# Patient Record
Sex: FEMALE | Race: WHITE
[De-identification: names, ages, dates, MRNs, and addresses within clinical notes are randomized per-mention and may not be internally consistent; named-entity substitution may affect disease eponyms.]

---

## 2017-10-17 ENCOUNTER — HOSPITAL ENCOUNTER (OUTPATIENT)
Dept: HOSPITAL 19 - MHCPAIN | Age: 72
End: 2017-10-17
Attending: ANESTHESIOLOGY
Payer: MEDICARE

## 2017-10-17 DIAGNOSIS — G89.29: Primary | ICD-10-CM

## 2017-10-17 DIAGNOSIS — M47.817: ICD-10-CM

## 2017-10-17 DIAGNOSIS — M53.3: ICD-10-CM

## 2017-10-17 DIAGNOSIS — M41.9: ICD-10-CM

## 2017-10-17 DIAGNOSIS — Z79.82: ICD-10-CM

## 2017-10-17 PROCEDURE — G0463 HOSPITAL OUTPT CLINIC VISIT: HCPCS

## 2017-11-08 ENCOUNTER — HOSPITAL ENCOUNTER (OUTPATIENT)
Dept: HOSPITAL 19 - MHCPAIN | Age: 72
End: 2017-11-08
Attending: ANESTHESIOLOGY
Payer: MEDICARE

## 2017-11-08 DIAGNOSIS — M41.9: ICD-10-CM

## 2017-11-08 DIAGNOSIS — M47.817: ICD-10-CM

## 2017-11-08 DIAGNOSIS — G89.29: Primary | ICD-10-CM

## 2017-11-08 PROCEDURE — G0463 HOSPITAL OUTPT CLINIC VISIT: HCPCS

## 2017-11-16 ENCOUNTER — HOSPITAL ENCOUNTER (OUTPATIENT)
Dept: HOSPITAL 19 - MHCPAIN | Age: 72
End: 2017-11-16
Attending: ANESTHESIOLOGY
Payer: MEDICARE

## 2017-11-16 DIAGNOSIS — M47.817: Primary | ICD-10-CM

## 2017-11-21 ENCOUNTER — HOSPITAL ENCOUNTER (OUTPATIENT)
Dept: HOSPITAL 19 - MHCPAIN | Age: 72
End: 2017-11-21
Attending: ANESTHESIOLOGY
Payer: MEDICARE

## 2017-11-21 DIAGNOSIS — M47.817: ICD-10-CM

## 2017-11-21 DIAGNOSIS — G89.29: Primary | ICD-10-CM

## 2017-11-21 DIAGNOSIS — M41.9: ICD-10-CM

## 2017-11-21 PROCEDURE — G0463 HOSPITAL OUTPT CLINIC VISIT: HCPCS

## 2017-12-14 ENCOUNTER — HOSPITAL ENCOUNTER (OUTPATIENT)
Dept: HOSPITAL 19 - MHCPAIN | Age: 72
End: 2017-12-14
Attending: ANESTHESIOLOGY
Payer: MEDICARE

## 2017-12-14 DIAGNOSIS — M47.817: Primary | ICD-10-CM

## 2018-01-17 ENCOUNTER — HOSPITAL ENCOUNTER (OUTPATIENT)
Dept: HOSPITAL 19 - MHCPAIN | Age: 73
End: 2018-01-17
Attending: ANESTHESIOLOGY
Payer: MEDICARE

## 2018-01-17 DIAGNOSIS — M41.9: ICD-10-CM

## 2018-01-17 DIAGNOSIS — G89.29: Primary | ICD-10-CM

## 2018-01-17 DIAGNOSIS — M47.817: ICD-10-CM

## 2018-01-17 PROCEDURE — G0463 HOSPITAL OUTPT CLINIC VISIT: HCPCS

## 2018-03-14 ENCOUNTER — HOSPITAL ENCOUNTER (OUTPATIENT)
Dept: HOSPITAL 19 - WSPT | Age: 73
LOS: 5 days | Discharge: HOME | End: 2018-03-19
Attending: ANESTHESIOLOGY
Payer: MEDICARE

## 2018-03-14 DIAGNOSIS — M41.9: ICD-10-CM

## 2018-03-14 DIAGNOSIS — M47.817: Primary | ICD-10-CM

## 2018-03-28 ENCOUNTER — HOSPITAL ENCOUNTER (OUTPATIENT)
Dept: HOSPITAL 19 - WSPT | Age: 73
LOS: 1 days | Discharge: HOME | End: 2018-03-29
Attending: ANESTHESIOLOGY
Payer: MEDICARE

## 2018-03-28 DIAGNOSIS — M47.817: Primary | ICD-10-CM

## 2018-03-28 DIAGNOSIS — M41.9: ICD-10-CM

## 2018-03-28 DIAGNOSIS — G89.29: ICD-10-CM

## 2018-03-28 DIAGNOSIS — Z91.040: ICD-10-CM

## 2018-04-17 ENCOUNTER — HOSPITAL ENCOUNTER (OUTPATIENT)
Dept: HOSPITAL 19 - MHCPAIN | Age: 73
End: 2018-04-17
Attending: ANESTHESIOLOGY
Payer: MEDICARE

## 2018-04-17 DIAGNOSIS — M41.9: ICD-10-CM

## 2018-04-17 DIAGNOSIS — M47.817: ICD-10-CM

## 2018-04-17 DIAGNOSIS — G89.29: Primary | ICD-10-CM

## 2018-04-17 DIAGNOSIS — M53.3: ICD-10-CM

## 2018-04-17 PROCEDURE — G0463 HOSPITAL OUTPT CLINIC VISIT: HCPCS

## 2019-12-02 ENCOUNTER — HOSPITAL ENCOUNTER (OUTPATIENT)
Dept: HOSPITAL 19 - EUO | Age: 74
Discharge: HOME | End: 2019-12-02
Attending: FAMILY MEDICINE
Payer: MEDICARE

## 2019-12-02 VITALS — HEIGHT: 65.98 IN | BODY MASS INDEX: 30.22 KG/M2 | WEIGHT: 188.05 LBS

## 2019-12-02 VITALS — TEMPERATURE: 98 F | HEART RATE: 62 BPM | DIASTOLIC BLOOD PRESSURE: 56 MMHG | SYSTOLIC BLOOD PRESSURE: 118 MMHG

## 2019-12-02 DIAGNOSIS — M81.0: Primary | ICD-10-CM

## 2020-01-15 ENCOUNTER — HOSPITAL ENCOUNTER (OUTPATIENT)
Dept: HOSPITAL 19 - WSPT | Age: 75
LOS: 1 days | Discharge: HOME | End: 2020-01-16
Attending: FAMILY MEDICINE
Payer: MEDICARE

## 2020-01-15 DIAGNOSIS — M41.86: ICD-10-CM

## 2020-01-15 DIAGNOSIS — M47.816: Primary | ICD-10-CM

## 2020-02-26 ENCOUNTER — HOSPITAL ENCOUNTER (OUTPATIENT)
Dept: HOSPITAL 19 - WSPT | Age: 75
LOS: 15 days | Discharge: HOME | End: 2020-03-12
Attending: FAMILY MEDICINE
Payer: MEDICARE

## 2020-02-26 DIAGNOSIS — M47.816: Primary | ICD-10-CM

## 2020-02-26 DIAGNOSIS — M41.86: ICD-10-CM

## 2020-06-25 ENCOUNTER — HOSPITAL ENCOUNTER (OUTPATIENT)
Dept: HOSPITAL 19 - EUO | Age: 75
Discharge: HOME | End: 2020-06-25
Attending: FAMILY MEDICINE
Payer: MEDICARE

## 2020-06-25 VITALS — BODY MASS INDEX: 31.14 KG/M2 | WEIGHT: 193.79 LBS | HEIGHT: 65.98 IN

## 2020-06-25 VITALS — DIASTOLIC BLOOD PRESSURE: 68 MMHG | TEMPERATURE: 98 F | SYSTOLIC BLOOD PRESSURE: 143 MMHG | HEART RATE: 65 BPM

## 2020-06-25 DIAGNOSIS — M81.0: Primary | ICD-10-CM

## 2020-12-30 ENCOUNTER — HOSPITAL ENCOUNTER (OUTPATIENT)
Dept: HOSPITAL 19 - EUO | Age: 75
Discharge: HOME | End: 2020-12-30
Attending: FAMILY MEDICINE
Payer: MEDICARE

## 2020-12-30 VITALS — WEIGHT: 196.87 LBS | BODY MASS INDEX: 31.64 KG/M2 | HEIGHT: 65.98 IN

## 2020-12-30 VITALS — SYSTOLIC BLOOD PRESSURE: 138 MMHG | HEART RATE: 70 BPM | DIASTOLIC BLOOD PRESSURE: 74 MMHG | TEMPERATURE: 98.4 F

## 2020-12-30 DIAGNOSIS — M81.0: Primary | ICD-10-CM

## 2021-07-07 ENCOUNTER — HOSPITAL ENCOUNTER (OUTPATIENT)
Dept: HOSPITAL 19 - EUO | Age: 76
Discharge: HOME | End: 2021-07-07
Attending: FAMILY MEDICINE
Payer: MEDICARE

## 2021-07-07 VITALS — DIASTOLIC BLOOD PRESSURE: 54 MMHG | HEART RATE: 65 BPM | SYSTOLIC BLOOD PRESSURE: 125 MMHG | TEMPERATURE: 98 F

## 2021-07-07 VITALS — WEIGHT: 199.08 LBS | BODY MASS INDEX: 31.99 KG/M2 | HEIGHT: 65.98 IN

## 2021-07-07 DIAGNOSIS — M81.0: Primary | ICD-10-CM

## 2022-01-31 ENCOUNTER — HOSPITAL ENCOUNTER (OUTPATIENT)
Dept: HOSPITAL 19 - EUO | Age: 77
End: 2022-01-31
Attending: FAMILY MEDICINE
Payer: MEDICARE

## 2022-01-31 VITALS — HEIGHT: 65.98 IN | BODY MASS INDEX: 31.57 KG/M2 | WEIGHT: 196.43 LBS

## 2022-01-31 VITALS — DIASTOLIC BLOOD PRESSURE: 65 MMHG | SYSTOLIC BLOOD PRESSURE: 154 MMHG | TEMPERATURE: 98.2 F | HEART RATE: 65 BPM

## 2022-01-31 DIAGNOSIS — M81.0: Primary | ICD-10-CM

## 2023-03-04 ENCOUNTER — HOSPITAL ENCOUNTER (INPATIENT)
Dept: HOSPITAL 19 - COL.ER | Age: 78
LOS: 3 days | Discharge: HOME | DRG: 310 | End: 2023-03-07
Attending: FAMILY MEDICINE | Admitting: FAMILY MEDICINE
Payer: MEDICARE

## 2023-03-04 VITALS — HEART RATE: 84 BPM | TEMPERATURE: 98.1 F | DIASTOLIC BLOOD PRESSURE: 78 MMHG | SYSTOLIC BLOOD PRESSURE: 133 MMHG

## 2023-03-04 VITALS — BODY MASS INDEX: 30.85 KG/M2 | WEIGHT: 185.19 LBS | HEIGHT: 65 IN

## 2023-03-04 VITALS — TEMPERATURE: 99.1 F | SYSTOLIC BLOOD PRESSURE: 145 MMHG | DIASTOLIC BLOOD PRESSURE: 92 MMHG | HEART RATE: 102 BPM

## 2023-03-04 VITALS — TEMPERATURE: 98.4 F | DIASTOLIC BLOOD PRESSURE: 98 MMHG | HEART RATE: 88 BPM | SYSTOLIC BLOOD PRESSURE: 145 MMHG

## 2023-03-04 DIAGNOSIS — Z90.710: ICD-10-CM

## 2023-03-04 DIAGNOSIS — E03.9: ICD-10-CM

## 2023-03-04 DIAGNOSIS — K21.9: ICD-10-CM

## 2023-03-04 DIAGNOSIS — Z23: ICD-10-CM

## 2023-03-04 DIAGNOSIS — Z91.81: ICD-10-CM

## 2023-03-04 DIAGNOSIS — I08.2: ICD-10-CM

## 2023-03-04 DIAGNOSIS — I10: ICD-10-CM

## 2023-03-04 DIAGNOSIS — F32.A: ICD-10-CM

## 2023-03-04 DIAGNOSIS — Z88.4: ICD-10-CM

## 2023-03-04 DIAGNOSIS — Z86.73: ICD-10-CM

## 2023-03-04 DIAGNOSIS — M19.90: ICD-10-CM

## 2023-03-04 DIAGNOSIS — I48.0: Primary | ICD-10-CM

## 2023-03-04 DIAGNOSIS — Z79.890: ICD-10-CM

## 2023-03-04 DIAGNOSIS — Z96.653: ICD-10-CM

## 2023-03-04 DIAGNOSIS — G25.81: ICD-10-CM

## 2023-03-04 DIAGNOSIS — H26.9: ICD-10-CM

## 2023-03-04 LAB
ALBUMIN SERPL-MCNC: 4.2 GM/DL (ref 3.4–4.8)
ALP SERPL-CCNC: 69 U/L (ref 40–150)
ALT SERPL-CCNC: 19 U/L (ref 0–55)
ANION GAP SERPL CALC-SCNC: 11 MMOL/L (ref 7–16)
AST SERPL-CCNC: 15 U/L (ref 5–34)
BASOPHILS # BLD: 0 K/MM3 (ref 0–0.2)
BASOPHILS NFR BLD AUTO: 0.6 % (ref 0–2)
BILIRUB SERPL-MCNC: 0.4 MG/DL (ref 0.2–1.2)
BUN SERPL-MCNC: 20 MG/DL (ref 10–20)
CALCIUM SERPL-MCNC: 9.6 MG/DL (ref 8.4–10.2)
CHLORIDE SERPL-SCNC: 106 MMOL/L (ref 98–107)
CO2 SERPL-SCNC: 22 MMOL/L (ref 23–31)
CREAT SERPL-SCNC: 1 MG/DL (ref 0.57–1.11)
EOSINOPHIL # BLD: 0.1 K/MM3 (ref 0–0.7)
EOSINOPHIL NFR BLD: 1.1 % (ref 0–4)
ERYTHROCYTE [DISTWIDTH] IN BLOOD BY AUTOMATED COUNT: 11.9 % (ref 11.5–14.5)
GLUCOSE SERPL-MCNC: 107 MG/DL (ref 70–99)
GRANULOCYTES # BLD AUTO: 72.8 % (ref 42.2–75.2)
HCT VFR BLD AUTO: 42.2 % (ref 37–47)
HGB BLD-MCNC: 14.4 G/DL (ref 12.5–16)
LYMPHOCYTES # BLD: 1.2 K/MM3 (ref 1.2–3.4)
LYMPHOCYTES NFR BLD: 17.8 % (ref 20–51)
MAGNESIUM SERPL-MCNC: 2.1 MG/DL (ref 1.6–2.6)
MCH RBC QN AUTO: 33 PG (ref 27–31)
MCHC RBC AUTO-ENTMCNC: 34 G/DL (ref 33–37)
MCV RBC AUTO: 98 FL (ref 80–100)
MONOCYTES # BLD: 0.5 K/MM3 (ref 0.1–0.6)
MONOCYTES NFR BLD AUTO: 7.4 % (ref 1.7–9.3)
NEUTROPHILS # BLD: 4.8 K/MM3 (ref 1.4–6.5)
PH UR STRIP.AUTO: 7 [PH] (ref 5–8)
PHOSPHATE SERPL-MCNC: 3.1 MG/DL (ref 2.3–4.7)
PLATELET # BLD AUTO: 229 K/MM3 (ref 130–400)
PMV BLD AUTO: 9.6 FL (ref 7.4–10.4)
POTASSIUM SERPL-SCNC: 4.2 MMOL/L (ref 3.5–4.5)
PROT SERPL-MCNC: 7.1 GM/DL (ref 6.2–8.1)
RBC # BLD AUTO: 4.31 M/MM3 (ref 4.1–5.3)
RBC # UR: (no result) /HPF (ref 0–2)
SODIUM SERPL-SCNC: 139 MMOL/L (ref 136–145)
SP GR UR STRIP.AUTO: 1.01 (ref 1–1.03)
SQUAMOUS # URNS: (no result) /HPF (ref 0–10)
TROPONIN I SERPL-MCNC: < 0.01 NG/ML (ref 0–0.03)
TSH SERPL DL<=0.005 MIU/L-ACNC: 0.91 UIU/ML (ref 0.35–4.94)
URN COLLECT METHOD CLASS: (no result)
UROBILINOGEN UR STRIP.AUTO-MCNC: 0.2 MG/DL
WBC # UR: (no result) /HPF (ref 0–2)

## 2023-03-04 PROCEDURE — A9500 TC99M SESTAMIBI: HCPCS

## 2023-03-04 PROCEDURE — G0378 HOSPITAL OBSERVATION PER HR: HCPCS

## 2023-03-04 NOTE — NUR
Patient to room 317 from the ED. A&Ox4. VSS. IV CDI. Denies pain and
discomfort. Nurse oriented the patient to location, room, and call light. No
further needs expressed. Call light within reach

## 2023-03-04 NOTE — NUR
NURSING SHIFT ASSESSMENT COMPLETED DURING THE ADMISSION ASSESSMENT AS THE
PATIENT ARRIVED DURING SHIFT CHANGE. SEE ADMISSION ASSESSMENT NOTE. CURRENTLY
THE PATIENT IS ALERT AND ORIENTED AND APPROPRIATE. CARDIZEM DRIP CONTINUES PER
ORDERS. AFIB NOTED ON THE MONITOR WITH A CONTROLLED RATE. WILL MONITOR.

## 2023-03-05 VITALS — HEART RATE: 58 BPM | SYSTOLIC BLOOD PRESSURE: 126 MMHG | TEMPERATURE: 97.8 F | DIASTOLIC BLOOD PRESSURE: 54 MMHG

## 2023-03-05 VITALS — DIASTOLIC BLOOD PRESSURE: 73 MMHG | TEMPERATURE: 97.8 F | HEART RATE: 77 BPM | SYSTOLIC BLOOD PRESSURE: 125 MMHG

## 2023-03-05 VITALS — SYSTOLIC BLOOD PRESSURE: 131 MMHG | HEART RATE: 62 BPM | TEMPERATURE: 97.7 F | DIASTOLIC BLOOD PRESSURE: 72 MMHG

## 2023-03-05 VITALS — SYSTOLIC BLOOD PRESSURE: 130 MMHG | DIASTOLIC BLOOD PRESSURE: 61 MMHG | TEMPERATURE: 98.2 F | HEART RATE: 84 BPM

## 2023-03-05 VITALS — DIASTOLIC BLOOD PRESSURE: 77 MMHG | SYSTOLIC BLOOD PRESSURE: 139 MMHG | HEART RATE: 100 BPM | TEMPERATURE: 98.3 F

## 2023-03-05 VITALS — HEART RATE: 79 BPM | DIASTOLIC BLOOD PRESSURE: 58 MMHG | SYSTOLIC BLOOD PRESSURE: 125 MMHG

## 2023-03-05 VITALS — DIASTOLIC BLOOD PRESSURE: 81 MMHG | HEART RATE: 92 BPM | TEMPERATURE: 98 F | SYSTOLIC BLOOD PRESSURE: 135 MMHG

## 2023-03-05 VITALS — DIASTOLIC BLOOD PRESSURE: 64 MMHG | SYSTOLIC BLOOD PRESSURE: 118 MMHG | HEART RATE: 77 BPM

## 2023-03-05 VITALS — SYSTOLIC BLOOD PRESSURE: 144 MMHG | TEMPERATURE: 98.8 F | DIASTOLIC BLOOD PRESSURE: 72 MMHG | HEART RATE: 99 BPM

## 2023-03-05 LAB
ANION GAP SERPL CALC-SCNC: 10 MMOL/L (ref 7–16)
ANION GAP SERPL CALC-SCNC: 12 MMOL/L (ref 7–16)
BASOPHILS # BLD: 0.1 K/MM3 (ref 0–0.2)
BASOPHILS NFR BLD AUTO: 0.8 % (ref 0–2)
BUN SERPL-MCNC: 14 MG/DL (ref 10–20)
BUN SERPL-MCNC: 15 MG/DL (ref 10–20)
CALCIUM SERPL-MCNC: 8.8 MG/DL (ref 8.4–10.2)
CALCIUM SERPL-MCNC: 9.3 MG/DL (ref 8.4–10.2)
CHLORIDE SERPL-SCNC: 107 MMOL/L (ref 98–107)
CHLORIDE SERPL-SCNC: 108 MMOL/L (ref 98–107)
CO2 SERPL-SCNC: 23 MMOL/L (ref 23–31)
CO2 SERPL-SCNC: 23 MMOL/L (ref 23–31)
CREAT SERPL-SCNC: 0.84 MG/DL (ref 0.57–1.11)
CREAT SERPL-SCNC: 0.94 MG/DL (ref 0.57–1.11)
EOSINOPHIL # BLD: 0.2 K/MM3 (ref 0–0.7)
EOSINOPHIL NFR BLD: 2.8 % (ref 0–4)
ERYTHROCYTE [DISTWIDTH] IN BLOOD BY AUTOMATED COUNT: 11.9 % (ref 11.5–14.5)
GLUCOSE SERPL-MCNC: 126 MG/DL (ref 70–99)
GLUCOSE SERPL-MCNC: 83 MG/DL (ref 70–99)
GRANULOCYTES # BLD AUTO: 61.1 % (ref 42.2–75.2)
HCT VFR BLD AUTO: 41.3 % (ref 37–47)
HGB BLD-MCNC: 14.4 G/DL (ref 12.5–16)
INR BLD: 1.3 (ref 0.8–3)
LYMPHOCYTES # BLD: 1.5 K/MM3 (ref 1.2–3.4)
LYMPHOCYTES NFR BLD: 25.1 % (ref 20–51)
MCH RBC QN AUTO: 33 PG (ref 27–31)
MCHC RBC AUTO-ENTMCNC: 35 G/DL (ref 33–37)
MCV RBC AUTO: 96 FL (ref 80–100)
MONOCYTES # BLD: 0.6 K/MM3 (ref 0.1–0.6)
MONOCYTES NFR BLD AUTO: 9.7 % (ref 1.7–9.3)
NEUTROPHILS # BLD: 3.7 K/MM3 (ref 1.4–6.5)
PLATELET # BLD AUTO: 232 K/MM3 (ref 130–400)
PMV BLD AUTO: 10 FL (ref 7.4–10.4)
POTASSIUM SERPL-SCNC: 4.1 MMOL/L (ref 3.5–4.5)
POTASSIUM SERPL-SCNC: 4.3 MMOL/L (ref 3.5–4.5)
PROTHROMBIN TIME: 15.3 SECONDS (ref 9.7–12.8)
RBC # BLD AUTO: 4.32 M/MM3 (ref 4.1–5.3)
SODIUM SERPL-SCNC: 141 MMOL/L (ref 136–145)
SODIUM SERPL-SCNC: 142 MMOL/L (ref 136–145)

## 2023-03-05 NOTE — NUR
Patient laying in bed, A&Ox4. VSS. IV CDI, fluids infusing. Denies pain and
discomfort. Call light within reach

## 2023-03-05 NOTE — NUR
THE PATIENT CALLED AT 0122 AND STATED "I FEEL LIKE MY CHEST IS TIGHT AND MY
HEART IS FLUTTERY." A SET OF VITAL SIGNS WERE OBTAINED AND THEY WERE WITHIN
NORMAL LIMITS. EDUCATION WAS PROVIDED ABOUT AFIB/FLUTTER AND THE PATIENT
STATED THAT THE RELATED SYMPTOMS WERE THE SAME AS WHAT BROUGHT HER TO THE
HOSPITAL. NO NEW SYMPTOMS WERE NOTED. A FLUTTER WAS NOTED ON THE MONITOR AT
THIS TIME. THE PATIENT REPORTED A HEADACHE AS WELL. RAMOS BLANCO WAS NOTIFIED
OF THESE SYMPTOMS AND THE PATIENT'S SITUATION WAS DISCUSSED. NEW ORDERS
RECEIVED. WILL CONTINUE TO MONITOR.

## 2023-03-05 NOTE — NUR
SW met with patient to complete intake. Patient provides she lives [facility] with her  Jasbir Baptiste [phone]. Patient provides she occasionally
uses a cane when needed, is independent with ADL's and does not utilize HH
services at this time. PCP is Dr. Terry and pharmacy is [facility]. DPOA/HC
is spouse. Patient plans to returm to her home upon DC. SW will continue to
follow.
 
DC plan: home

## 2023-03-06 VITALS — SYSTOLIC BLOOD PRESSURE: 146 MMHG | HEART RATE: 122 BPM | DIASTOLIC BLOOD PRESSURE: 75 MMHG

## 2023-03-06 VITALS — HEART RATE: 64 BPM | TEMPERATURE: 98.4 F | SYSTOLIC BLOOD PRESSURE: 129 MMHG | DIASTOLIC BLOOD PRESSURE: 76 MMHG

## 2023-03-06 VITALS — SYSTOLIC BLOOD PRESSURE: 131 MMHG | DIASTOLIC BLOOD PRESSURE: 85 MMHG | HEART RATE: 67 BPM

## 2023-03-06 VITALS — DIASTOLIC BLOOD PRESSURE: 69 MMHG | HEART RATE: 116 BPM | SYSTOLIC BLOOD PRESSURE: 136 MMHG

## 2023-03-06 VITALS — DIASTOLIC BLOOD PRESSURE: 70 MMHG | HEART RATE: 121 BPM | SYSTOLIC BLOOD PRESSURE: 108 MMHG

## 2023-03-06 VITALS — DIASTOLIC BLOOD PRESSURE: 58 MMHG | SYSTOLIC BLOOD PRESSURE: 139 MMHG | HEART RATE: 78 BPM

## 2023-03-06 VITALS — DIASTOLIC BLOOD PRESSURE: 58 MMHG | HEART RATE: 59 BPM | TEMPERATURE: 98.7 F | SYSTOLIC BLOOD PRESSURE: 116 MMHG

## 2023-03-06 VITALS — DIASTOLIC BLOOD PRESSURE: 62 MMHG | SYSTOLIC BLOOD PRESSURE: 132 MMHG | HEART RATE: 84 BPM

## 2023-03-06 VITALS — HEART RATE: 72 BPM | DIASTOLIC BLOOD PRESSURE: 60 MMHG | SYSTOLIC BLOOD PRESSURE: 125 MMHG

## 2023-03-06 VITALS — SYSTOLIC BLOOD PRESSURE: 132 MMHG | HEART RATE: 84 BPM | TEMPERATURE: 98.8 F | DIASTOLIC BLOOD PRESSURE: 62 MMHG

## 2023-03-06 VITALS — DIASTOLIC BLOOD PRESSURE: 69 MMHG | HEART RATE: 122 BPM | SYSTOLIC BLOOD PRESSURE: 134 MMHG

## 2023-03-06 VITALS — SYSTOLIC BLOOD PRESSURE: 124 MMHG | TEMPERATURE: 98.6 F | DIASTOLIC BLOOD PRESSURE: 51 MMHG | HEART RATE: 75 BPM

## 2023-03-06 VITALS — DIASTOLIC BLOOD PRESSURE: 71 MMHG | HEART RATE: 76 BPM | SYSTOLIC BLOOD PRESSURE: 147 MMHG

## 2023-03-06 VITALS — HEART RATE: 72 BPM | SYSTOLIC BLOOD PRESSURE: 125 MMHG | DIASTOLIC BLOOD PRESSURE: 60 MMHG

## 2023-03-06 VITALS — SYSTOLIC BLOOD PRESSURE: 123 MMHG | HEART RATE: 77 BPM | DIASTOLIC BLOOD PRESSURE: 56 MMHG

## 2023-03-06 LAB
ANION GAP SERPL CALC-SCNC: 10 MMOL/L (ref 7–16)
ANION GAP SERPL CALC-SCNC: 9 MMOL/L (ref 7–16)
BASOPHILS # BLD: 0 K/MM3 (ref 0–0.2)
BASOPHILS NFR BLD AUTO: 0.6 % (ref 0–2)
BUN SERPL-MCNC: 12 MG/DL (ref 10–20)
BUN SERPL-MCNC: 13 MG/DL (ref 10–20)
CALCIUM SERPL-MCNC: 8.9 MG/DL (ref 8.4–10.2)
CALCIUM SERPL-MCNC: 9 MG/DL (ref 8.4–10.2)
CHLORIDE SERPL-SCNC: 110 MMOL/L (ref 98–107)
CHLORIDE SERPL-SCNC: 110 MMOL/L (ref 98–107)
CO2 SERPL-SCNC: 21 MMOL/L (ref 23–31)
CO2 SERPL-SCNC: 22 MMOL/L (ref 23–31)
CREAT SERPL-SCNC: 0.79 MG/DL (ref 0.57–1.11)
CREAT SERPL-SCNC: 0.87 MG/DL (ref 0.57–1.11)
EOSINOPHIL # BLD: 0.1 K/MM3 (ref 0–0.7)
EOSINOPHIL NFR BLD: 1.4 % (ref 0–4)
ERYTHROCYTE [DISTWIDTH] IN BLOOD BY AUTOMATED COUNT: 11.7 % (ref 11.5–14.5)
GLUCOSE SERPL-MCNC: 102 MG/DL (ref 70–99)
GLUCOSE SERPL-MCNC: 94 MG/DL (ref 70–99)
GRANULOCYTES # BLD AUTO: 74.1 % (ref 42.2–75.2)
HCT VFR BLD AUTO: 41.6 % (ref 37–47)
HGB BLD-MCNC: 14.3 G/DL (ref 12.5–16)
INR BLD: 1.4 (ref 0.8–3)
LYMPHOCYTES # BLD: 1 K/MM3 (ref 1.2–3.4)
LYMPHOCYTES NFR BLD: 14.3 % (ref 20–51)
MCH RBC QN AUTO: 33 PG (ref 27–31)
MCHC RBC AUTO-ENTMCNC: 34 G/DL (ref 33–37)
MCV RBC AUTO: 95 FL (ref 80–100)
MONOCYTES # BLD: 0.6 K/MM3 (ref 0.1–0.6)
MONOCYTES NFR BLD AUTO: 9.6 % (ref 1.7–9.3)
NEUTROPHILS # BLD: 4.9 K/MM3 (ref 1.4–6.5)
PLATELET # BLD AUTO: 235 K/MM3 (ref 130–400)
PMV BLD AUTO: 9.7 FL (ref 7.4–10.4)
POTASSIUM SERPL-SCNC: 4 MMOL/L (ref 3.5–4.5)
POTASSIUM SERPL-SCNC: 4.1 MMOL/L (ref 3.5–4.5)
PROTHROMBIN TIME: 15.6 SECONDS (ref 9.7–12.8)
RBC # BLD AUTO: 4.36 M/MM3 (ref 4.1–5.3)
SODIUM SERPL-SCNC: 141 MMOL/L (ref 136–145)
SODIUM SERPL-SCNC: 141 MMOL/L (ref 136–145)

## 2023-03-06 PROCEDURE — 5A2204Z RESTORATION OF CARDIAC RHYTHM, SINGLE: ICD-10-PCS | Performed by: INTERNAL MEDICINE

## 2023-03-06 NOTE — NUR
PT RESTING IN BED. MORNING MEDICATIONS GIVEN. SHIFT ASSESSMENT COMPLETED. PT
ADRIANO MCNALLY ON HOLD FOR PROCEDURE. PT UPDATED ON POC, CONSENT FOR
PROCEDURE SIGNED AND ON THE CHART. PT DENIES ANY PAIN OR NEDS. ZOFRAN GIVEN
PROIOR TO PROCEDURE DUE TO ANESTHESIA REACTIONS. CALL LIGHT WITHIN REACH. PT
REMAINS NPO. WILL CONTINUE TO MONITOR.

## 2023-03-06 NOTE — NUR
PT REMAINS IN NSR FOLLOWING CARDIOVERSION. DENIES ANY PAIN OR NEEDS THROUGHOUT
SHIFT. VSS. WILL PASS ALONG REPORT TO ONCOMING RN.

## 2023-03-06 NOTE — NUR
Patient assessed around 2025. Complained of nausea. Does have a history of
nausea with anesthesia. Given PRN Zofran per orders. Remains in normal sinus
on telemetry. Some redness to chest/back from cardioversion pads. Denies
itching and discomfort to areas. Voices no further questions, needs, or
concerns at this time. In bed with call light within reach. High fall risk
precautions in place. Bed alarm on.

## 2023-03-06 NOTE — NUR
NURSING SHIFT ASSESSMENT COMPLETED. THE PATIENT DENIED PAIN OR DISCOMFORT.
CARDIZEM DRIP CONTINUES AT ORDERED RATE. A FIB/FLUTTER NOTED ON THE MONITOR
RATE CONTROLLED. NO NEEDS AT THIS TIME. WILL MONITOR.

## 2023-03-07 VITALS — HEART RATE: 54 BPM | DIASTOLIC BLOOD PRESSURE: 51 MMHG | TEMPERATURE: 98.2 F | SYSTOLIC BLOOD PRESSURE: 115 MMHG

## 2023-03-07 VITALS — HEART RATE: 53 BPM | DIASTOLIC BLOOD PRESSURE: 55 MMHG | SYSTOLIC BLOOD PRESSURE: 100 MMHG | TEMPERATURE: 98.2 F

## 2023-03-07 VITALS — DIASTOLIC BLOOD PRESSURE: 54 MMHG | HEART RATE: 54 BPM | TEMPERATURE: 98.3 F | SYSTOLIC BLOOD PRESSURE: 119 MMHG

## 2023-03-07 VITALS — HEART RATE: 56 BPM | SYSTOLIC BLOOD PRESSURE: 115 MMHG | TEMPERATURE: 98 F | DIASTOLIC BLOOD PRESSURE: 59 MMHG

## 2023-03-07 LAB
ANION GAP SERPL CALC-SCNC: 8 MMOL/L (ref 7–16)
BASOPHILS # BLD: 0 K/MM3 (ref 0–0.2)
BASOPHILS NFR BLD AUTO: 0.7 % (ref 0–2)
BUN SERPL-MCNC: 17 MG/DL (ref 10–20)
CALCIUM SERPL-MCNC: 8.5 MG/DL (ref 8.4–10.2)
CHLORIDE SERPL-SCNC: 108 MMOL/L (ref 98–107)
CO2 SERPL-SCNC: 22 MMOL/L (ref 23–31)
CREAT SERPL-SCNC: 1 MG/DL (ref 0.57–1.11)
EOSINOPHIL # BLD: 0.2 K/MM3 (ref 0–0.7)
EOSINOPHIL NFR BLD: 2.5 % (ref 0–4)
ERYTHROCYTE [DISTWIDTH] IN BLOOD BY AUTOMATED COUNT: 11.9 % (ref 11.5–14.5)
GLUCOSE SERPL-MCNC: 84 MG/DL (ref 70–99)
GRANULOCYTES # BLD AUTO: 64 % (ref 42.2–75.2)
HCT VFR BLD AUTO: 38.3 % (ref 37–47)
HGB BLD-MCNC: 13.1 G/DL (ref 12.5–16)
LYMPHOCYTES # BLD: 1.4 K/MM3 (ref 1.2–3.4)
LYMPHOCYTES NFR BLD: 22.5 % (ref 20–51)
MCH RBC QN AUTO: 33 PG (ref 27–31)
MCHC RBC AUTO-ENTMCNC: 34 G/DL (ref 33–37)
MCV RBC AUTO: 96 FL (ref 80–100)
MONOCYTES # BLD: 0.6 K/MM3 (ref 0.1–0.6)
MONOCYTES NFR BLD AUTO: 10.1 % (ref 1.7–9.3)
NEUTROPHILS # BLD: 3.9 K/MM3 (ref 1.4–6.5)
PLATELET # BLD AUTO: 233 K/MM3 (ref 130–400)
PMV BLD AUTO: 10.1 FL (ref 7.4–10.4)
POTASSIUM SERPL-SCNC: 4.4 MMOL/L (ref 3.5–4.5)
RBC # BLD AUTO: 3.98 M/MM3 (ref 4.1–5.3)
SODIUM SERPL-SCNC: 138 MMOL/L (ref 136–145)

## 2023-03-07 NOTE — NUR
Discharge instructions reviewed with patient and her spouse. Both verbalize
understanding. INT to RFA d/c'd with cath tip intact. Tele d/c'd. IV site to
RFA and previous IV site to LAC red- encouraged pt to use warm moist heat to
both sites once at home. Pt discharged home with spouse via private vehicle.

## 2023-03-07 NOTE — NUR
No further complaints of nausea this shift. Denies having pain and discomfort.
Remains in sinus rhythm. Voices no questions, needs, or concerns at this time.
In bed with call light within reach.

## 2023-03-07 NOTE — NUR
Initial visit:  Pt was resting and content.  Pt has no needs right now.  Pt
apprecaited the visit.   will follow up as needed.

## 2023-05-02 ENCOUNTER — HOSPITAL ENCOUNTER (OUTPATIENT)
Dept: HOSPITAL 19 - EUO | Age: 78
Discharge: HOME | End: 2023-05-02
Attending: FAMILY MEDICINE
Payer: MEDICARE

## 2023-05-02 VITALS — TEMPERATURE: 98.4 F | HEART RATE: 57 BPM | SYSTOLIC BLOOD PRESSURE: 159 MMHG | DIASTOLIC BLOOD PRESSURE: 78 MMHG

## 2023-05-02 VITALS — WEIGHT: 180.78 LBS | BODY MASS INDEX: 30.12 KG/M2 | HEIGHT: 65.05 IN

## 2023-05-02 DIAGNOSIS — M81.0: Primary | ICD-10-CM

## 2023-05-02 NOTE — NUR
PT WAS BROUGHT BY WHEELCHAIR TO [facility] FOR A PROLIA INJECTION. MEDS AND HX
REVIEWED. PT HAD NO NEW CONCERNS AT THIS TIME. PT TOLERATED PROLIA INJECTION.
POST INJECTION, PT WAS WHEELED OFF THE EU TO THIER CAR.